# Patient Record
Sex: MALE | Race: BLACK OR AFRICAN AMERICAN | NOT HISPANIC OR LATINO | Employment: FULL TIME | URBAN - METROPOLITAN AREA
[De-identification: names, ages, dates, MRNs, and addresses within clinical notes are randomized per-mention and may not be internally consistent; named-entity substitution may affect disease eponyms.]

---

## 2020-11-01 ENCOUNTER — COMMUNICATION - HEALTHEAST (OUTPATIENT)
Dept: SCHEDULING | Facility: CLINIC | Age: 30
End: 2020-11-01

## 2021-10-09 ENCOUNTER — HOSPITAL ENCOUNTER (EMERGENCY)
Facility: HOSPITAL | Age: 31
Discharge: HOME | End: 2021-10-09
Admitting: PHYSICIAN ASSISTANT
Payer: MEDICAID

## 2021-10-09 VITALS
RESPIRATION RATE: 14 BRPM | DIASTOLIC BLOOD PRESSURE: 96 MMHG | OXYGEN SATURATION: 100 % | SYSTOLIC BLOOD PRESSURE: 163 MMHG | HEART RATE: 64 BPM | WEIGHT: 315 LBS | BODY MASS INDEX: 43.63 KG/M2 | TEMPERATURE: 98.2 F

## 2021-10-09 DIAGNOSIS — Z20.822 EXPOSURE TO 2019 NOVEL CORONAVIRUS: ICD-10-CM

## 2021-10-09 LAB — SARS-COV-2 RNA RESP QL NAA+PROBE: NEGATIVE

## 2021-10-09 PROCEDURE — 87635 SARS-COV-2 COVID-19 AMP PRB: CPT | Performed by: EMERGENCY MEDICINE

## 2021-10-09 PROCEDURE — 99283 EMERGENCY DEPT VISIT LOW MDM: CPT

## 2021-10-09 PROCEDURE — C9803 HOPD COVID-19 SPEC COLLECT: HCPCS

## 2021-10-09 NOTE — ED PROVIDER NOTES
ED PROVIDER NOTE    EMERGENCY DEPARTMENT ENCOUNTER      NAME: Kar Douglas  AGE: 31 year old male  YOB: 1990  MRN: [id number]  EVALUATION DATE & TIME: No admission date for patient encounter.    PCP: No primary care provider on file.    ED PROVIDER: Shawna Randall PA-C      Chief Complaint   Patient presents with     Covid 19 Testing         FINAL IMPRESSION:  1. Exposure to 2019 novel coronavirus          MEDICAL DECISION MAKING:    Pertinent Labs & Imaging studies reviewed. (See chart for details)  31 year old male who presents to the Emergency Department for covid testing.    Exposed to someone in his shared housing complex that tested +1-week ago but he does not have any symptoms.    Vitals stable other than some mild asymptomatic hypertension.  He looks well, exam normal.    Covid testing obtained and actually returned while he was still in the department so he was updated on this negative result.      At the conclusion of the encounter I discussed the results of all of the tests and the disposition. The questions were answered. The patient or family acknowledged understanding and was agreeable with the care plan.       ED COURSE  10:36 AM  Met and evaluated patient. Discussed ED plan.   11:19 AM Patient has been discharged but still in [place] with other roommates so was updated on negative test result by nurse      MEDICATIONS GIVEN IN THE EMERGENCY:  Medications - No data to display    NEW PRESCRIPTIONS STARTED AT TODAY'S ER VISIT  New Prescriptions    No medications on file          =================================================================    HPI    Patient information was obtained from: Patient    Kar Douglas is a 31 year old male who is otherwise healthywho presents for covid testing.    Patient exposed to someone in shared housing complex that tested positive for covid 1 week ago.    Patient is not having any symptoms.  Feels fine. No complaints      REVIEW OF SYSTEMS    Constitutional: Negative for fevers, chills.  Vision: Negative for vision changes  HENT:  Negative for congestion, sore throat  Pulmonary: Negative for shortness of breath. No cough  Cardiac: Negative for chest pain  GI:Negative for nausea, vomiting, diarrhea, abdominal pain  : Negative for urinary symptoms  Musculoskeletal: Negative for extremity pain  Skin: Negative for skin changes  Neuro: Negative for focal weakness, numbness    All other systems reviewed and are negative        PAST MEDICAL HISTORY:  Past Medical History:   Diagnosis Date     Bipolar disorder (H)      Schizophrenia (H)        PAST SURGICAL HISTORY:  History reviewed. No pertinent surgical history.        CURRENT MEDICATIONS:    Denies taking any meds    ALLERGIES:  No Known Allergies    FAMILY HISTORY:  History reviewed. No pertinent family history.    SOCIAL HISTORY:   Smoking: + tobacco use  Alcohol: Denies  Illicit drug: Denies    Other: Lives in shared housing    VITALS:  Vitals:    10/09/21 1005   BP: (!) 163/96   Pulse: 64   Resp: 14   Temp: 98.2  F (36.8  C)   TempSrc: Temporal   SpO2: 100%   Weight: 150 kg (330 lb 11 oz)       PHYSICAL EXAM    General Appearance:  Alert, cooperative, no distress, appears stated age  HENT: Normocephalic without obvious deformity, atraumatic. Oropharynx benign.    Eyes: Conjunctiva clear, Lids normal. No discharge.   Respiratory: No distress. Lungs clear to ausculation bilaterally. No wheezes, rhonchi or stridor  Cardiovascular: Regular rate and rhythm, no murmur.   GI: Abdomen soft, nontender  Musculoskeletal: Moving all extremities. No gross deformities  Integument: Warm, dry, no rashes or lesions  Neurologic: Alert and orientated x3. No focal deficits.  Psych: Normal mood and affect        LAB:  Labs Ordered and Resulted from Time of ED Arrival Up to the Time of Departure from the ED - No data to display    RADIOLOGY:  No orders to display           Shawna Randall PA-C   Emergency Medicine        Shawna Randall PA-C  10/09/21 1129

## 2021-10-09 NOTE — DISCHARGE INSTRUCTIONS
"Your covid test is pending.    You will receive a phone call if your test comes back positive. Otherwise you can get your results via [employer].  Continue to quarantine until you know your results.        How can I protect others?  If you have symptoms (fever, cough, body aches or trouble breathing):  Stay home and away from others (self-isolate) until:  Your other symptoms have resolved (gotten better). And   You've had no fever--and no medicine that reduces fever--for 1 full day (24 hours). And   At least 10 days have passed since your symptoms started. (You may need to wait 20 days. Follow the advice of your care team.)  If you don't show symptoms, but testing showed that you have COVID-19:  Stay home and away from others (self-isolate) until at least 10 days have passed since the date of your first positive COVID-19 test.  During this time  Stay in your own room, even for meals. Use your own bathroom if you can.  Stay away from others in your home. No hugging, kissing or shaking hands. No visitors.  Don't go to work, school or anywhere else.  Clean \"high touch\" surfaces often (doorknobs, counters, handles). Use household cleaning spray or wipes.  You'll find a full list of  on the EPA website: www.epa.gov/pesticide-registration/list-n-disinfectants-use-against-sars-cov-2.  Cover your mouth and nose with a mask or other face covering to avoid spreading germs.  Wash your hands and face often. Use soap and water.  Caregivers in these groups are at risk for severe illness due to COVID-19:  People 65 years and older  People who live in a nursing home or long-term care facility  People with chronic disease (lung, heart, cancer, diabetes, kidney, liver, immunologic)  People who have a weakened immune system, including those who:  Are in cancer treatment  Take medicine that weakens the immune system, such as corticosteroids  Had a bone marrow or organ transplant  Have an immune deficiency  Have poorly controlled " HIV or AIDS  Are obese (body mass index of 40 or higher)  Smoke regularly  Caregivers should wear gloves while washing dishes, handling laundry and cleaning bedrooms and bathrooms.  Use caution when washing and drying laundry: Don't shake dirty laundry and use the warmest water setting that you can.  For more tips on managing your health at home, go to www.cdc.gov/coronavirus/2019-ncov/downloads/10Things.pdf.  How can I take care of myself at home?  Get lots of rest. Drink extra fluids (unless a doctor has told you not to).  Take Tylenol (acetaminophen) for fever or pain. If you have liver or kidney problems, ask your family doctor if it's okay to take Tylenol.   Adults can take either:   650 mg (two 325 mg pills) every 4 to 6 hours, or   1,000 mg (two 500 mg pills) every 8 hours as needed.  Note: Don't take more than 3,000 mg in one day. Acetaminophen is found in many medicines (both prescribed and over-the-counter medicines). Read all labels to be sure you don't take too much.

## 2021-11-07 ENCOUNTER — HOSPITAL ENCOUNTER (EMERGENCY)
Facility: HOSPITAL | Age: 31
Discharge: LEFT BEFORE DISCHARGE | End: 2021-11-07
Payer: MEDICAID

## 2021-11-07 VITALS — TEMPERATURE: 98.8 F | HEIGHT: 74 IN | WEIGHT: 315 LBS | BODY MASS INDEX: 40.43 KG/M2

## 2021-11-07 ASSESSMENT — MIFFLIN-ST. JEOR: SCORE: 2503.94

## 2021-11-07 NOTE — ED TRIAGE NOTES
Patient with diagnosis of schizo phrenia has not been on meds for 7 years. Was taking street drugs including meth, stopped in Aug when he was in treatment. Last month not sleeping well, having dreams, lst two days increasing anxiety, paranoia, denies SI or HI

## 2021-12-10 ENCOUNTER — HOSPITAL ENCOUNTER (EMERGENCY)
Facility: HOSPITAL | Age: 31
Discharge: LEFT BEFORE DISCHARGE | End: 2021-12-10
Attending: EMERGENCY MEDICINE
Payer: COMMERCIAL

## 2021-12-10 VITALS
OXYGEN SATURATION: 98 % | RESPIRATION RATE: 16 BRPM | SYSTOLIC BLOOD PRESSURE: 146 MMHG | HEART RATE: 75 BPM | WEIGHT: 315 LBS | BODY MASS INDEX: 43.01 KG/M2 | DIASTOLIC BLOOD PRESSURE: 85 MMHG | TEMPERATURE: 98.4 F

## 2021-12-10 DIAGNOSIS — Z53.21 PATIENT LEFT WITHOUT BEING SEEN: ICD-10-CM

## 2021-12-10 NOTE — ED TRIAGE NOTES
Patient presents here seeking help with resuming his medication schedule to treat his paranoid schizophrenia symptoms. He has not taken medications for about 7-8 years and has been taking street drugs. He reports that he has been sober and free of street drug use for four months and states that he is having difficulty with dealing with his feelings of paranoia and anxiety. He is not suicidal, but he does states that if another man stares him down, he has the urge to fight.

## 2021-12-11 NOTE — ED PROVIDER NOTES
8:06 PM I looked for the patient in the triage area and also the charge of the for the patient.  I called the patient's listed cell phone number and there is no answer.  Patient has apparently left after triage.     Pablo Ford,   12/10/21 2007

## 2022-05-08 ENCOUNTER — HOSPITAL ENCOUNTER (EMERGENCY)
Facility: HOSPITAL | Age: 32
Discharge: HOME | End: 2022-05-08
Attending: EMERGENCY MEDICINE | Admitting: EMERGENCY MEDICINE
Payer: COMMERCIAL

## 2022-05-08 VITALS
BODY MASS INDEX: 43.01 KG/M2 | WEIGHT: 315 LBS | TEMPERATURE: 97.9 F | RESPIRATION RATE: 16 BRPM | DIASTOLIC BLOOD PRESSURE: 97 MMHG | SYSTOLIC BLOOD PRESSURE: 175 MMHG | HEART RATE: 62 BPM | OXYGEN SATURATION: 100 %

## 2022-05-08 DIAGNOSIS — K04.7 DENTAL INFECTION: ICD-10-CM

## 2022-05-08 DIAGNOSIS — K02.9 DENTAL CARIES: ICD-10-CM

## 2022-05-08 PROCEDURE — 99282 EMERGENCY DEPT VISIT SF MDM: CPT

## 2022-05-08 NOTE — ED PROVIDER NOTES
EMERGENCY DEPARTMENT ENCOUNTER      NAME: Kar Douglas  AGE: 31 year old male  YOB: 1990  MRN: [id number]  EVALUATION DATE & TIME: 2022  5:06 PM    PCP: No Ref-Primary, Physician    ED PROVIDER: Jovani Enrique D.O.      Chief Complaint   Patient presents with     Oral Swelling       FINAL IMPRESSION:  1. Dental infection        ED COURSE & MEDICAL DECISION MAKIN:28 PM I met with the patient to gather history and to perform my initial exam. I discussed the plan for care while in the Emergency Department. PPE: surgical mask We discussed the plan for discharge and the patient is agreeable. Reviewed supportive cares, symptomatic treatment, outpatient follow up, and reasons to return to the Emergency Department. Patient to be discharged by ED RN.        Pertinent Labs & Imaging studies reviewed. (See chart for details)  31 year old male presents to the Emergency Department for evaluation of swelling of the right side of the face.  There is a dental carry at the origin of the swelling.  Patient be consistent with a dental infection without obvious drainable abscess.  Patient has a plan to have his tooth removed in 3 weeks.  Therefore at this time we will discharge on antibiotics for suspected dental infection and have follow-up as an outpatient with his dentist.  Return precautions were discussed.    At the conclusion of the encounter I discussed the results of all of the tests and the disposition. The questions were answered. The patient or family acknowledged understanding and was agreeable with the care plan.    HPI    Patient information was obtained from: patient     Use of : N/A      Kar Douglas is a 31 year old male who presents with right sided facial swelling.    Patient reports right sided facial swelling that he noticed after waking up 3 days ago. He states that his face is not painful unless he presses on it. Does have a scheduled appointment with his dentist in 3  weeks.  Denies any chest pain, shortness of breath, fever, cough, congestion, nausea, or vomiting. Denies any history of asthmas, diabetes, or having any other medical problems. Denies any history of surgeries. Notes that he smokes and drinks alcohol.       REVIEW OF SYSTEMS  Constitutional:  Denies fever, chills, weight loss or weakness  Eyes:  No pain, discharge, redness  HENT:  Denies sore throat, ear pain, congestion  Respiratory: No SOB, wheeze or cough  Cardiovascular:  No CP, palpitations  GI:  Denies abdominal pain, nausea, vomiting, diarrhea  : Denies dysuria, hematuria  Musculoskeletal: Positive for right sided facial swelling  Denies any new muscle/joint painor loss of function.  Skin: Denies rash, pallor  Neurologic:  Denies headache, focal weakness or sensory changes  Lymph: Denies swollen nodes    All other systems negative unless noted in HPI.    PAST MEDICAL HISTORY:  Past Medical History:   Diagnosis Date     Bipolar disorder (H)      Schizophrenia (H)        PAST SURGICAL HISTORY:  No past surgical history on file.      CURRENT MEDICATIONS:    No current facility-administered medications for this encounter.     Current Outpatient Medications   Medication     albuterol (PROVENTIL HFA;VENTOLIN HFA) 90 mcg/actuation inhaler     hydrOXYzine (VISTARIL) 25 MG capsule     hydrOXYzine (VISTARIL) 25 MG capsule     ibuprofen (ADVIL,MOTRIN) 600 MG tablet     QUEtiapine (SEROQUEL) 300 MG tablet     QUEtiapine (SEROQUEL) 50 MG tablet     therapeutic multivitamin (THERAGRAN) tablet         ALLERGIES:  No Known Allergies    FAMILY HISTORY:  No family history on file.    SOCIAL HISTORY:  Social History     Socioeconomic History     Marital status: Single   Tobacco Use     Smoking status: Current Every Day Smoker   Substance and Sexual Activity     Alcohol use: No     Drug use: Yes     Types: Marijuana       VITALS:  Patient Vitals for the past 24 hrs:   BP Temp Temp src Pulse Resp SpO2 Weight   05/08/22 1715  137/83 -- -- 60 -- 100 % --   05/08/22 1531 138/66 97.9  F (36.6  C) Temporal 72 16 99 % (!) 152 kg (335 lb)       PHYSICAL EXAM    VITAL SIGNS: /83   Pulse 60   Temp 97.9  F (36.6  C) (Temporal)   Resp 16   Wt (!) 152 kg (335 lb)   SpO2 100%   BMI 43.01 kg/m      General: Appears in no acute distress, awake, alert, interactive.  Eyes: Conjunctivae non-injected. Sclera anicteric.  HENT: Sakshi of upper right middle molar.  Atraumatic, normocephalic  Neck: Supple, normal ROM  Respiratory/Chest: Respiration unlabored, no tachypnea  Abdomen: non distended  Musculoskeletal: Normal extremities. No edema or erythema.  Skin: Normal color. No rash or diaphoresis.  Neurologic: Alert and oriented, face symmetric, moves all extremities spontaneously. Speech clear.  Psychiatric:  Affect normal, Judgment normal, Mood normal.        LABS  No results found for this or any previous visit (from the past 24 hour(s)).      RADIOLOGY  No orders to display        MEDICATIONS GIVEN IN THE EMERGENCY:  Medications - No data to display    NEW PRESCRIPTIONS STARTED AT TODAY'S ER VISIT  New Prescriptions    No medications on file      I, Brandy Molina, am serving as a scribe to document services personally performed by Dr. Jovani Enrique, based on my observations and the provider's statements to me.   I, Dr. Enrique, attest that Brandy Molina was acting in a scribe capacity, has observed my performance of the services and has documented them in accordance with my direction.    Jovani Enrique D.O.  Emergency Medicine  [facility] EMERGENCY DEPARTMENT  [address] [place] [id number]  [phone]  Dept: [phone]     Jovani Enrique, DO  05/08/22 2117       Jovani Enrique, DO  05/08/22 2118

## 2022-05-08 NOTE — ED TRIAGE NOTES
Pt arrives with right upper oral swelling for about 2-3 days. Denies pain unless he presses on the area. Able to eat and drink without difficulty. Does state he has a cavity, but his dental appointment is a month out. Worried about infection.

## 2022-07-20 ENCOUNTER — LAB REQUISITION (OUTPATIENT)
Dept: LAB | Facility: CLINIC | Age: 32
End: 2022-07-20

## 2022-07-20 DIAGNOSIS — Z13.1 ENCOUNTER FOR SCREENING FOR DIABETES MELLITUS: ICD-10-CM

## 2022-07-20 DIAGNOSIS — E66.01 MORBID (SEVERE) OBESITY DUE TO EXCESS CALORIES (H): ICD-10-CM

## 2022-07-20 DIAGNOSIS — R53.83 OTHER FATIGUE: ICD-10-CM

## 2022-07-20 LAB
ERYTHROCYTE [DISTWIDTH] IN BLOOD BY AUTOMATED COUNT: 12.4 % (ref 10–15)
HCT VFR BLD AUTO: 44.9 % (ref 40–53)
HGB BLD-MCNC: 14.6 G/DL (ref 13.3–17.7)
MCH RBC QN AUTO: 28.7 PG (ref 26.5–33)
MCHC RBC AUTO-ENTMCNC: 32.5 G/DL (ref 31.5–36.5)
MCV RBC AUTO: 88 FL (ref 78–100)
PLATELET # BLD AUTO: 258 10E3/UL (ref 150–450)
RBC # BLD AUTO: 5.09 10E6/UL (ref 4.4–5.9)
TSH SERPL DL<=0.005 MIU/L-ACNC: 1.58 UIU/ML (ref 0.3–4.2)
WBC # BLD AUTO: 6.7 10E3/UL (ref 4–11)

## 2022-07-20 PROCEDURE — 84443 ASSAY THYROID STIM HORMONE: CPT | Performed by: NURSE PRACTITIONER

## 2022-07-20 PROCEDURE — 80053 COMPREHEN METABOLIC PANEL: CPT | Performed by: NURSE PRACTITIONER

## 2022-07-20 PROCEDURE — 80061 LIPID PANEL: CPT | Performed by: NURSE PRACTITIONER

## 2022-07-20 PROCEDURE — 85014 HEMATOCRIT: CPT | Performed by: NURSE PRACTITIONER

## 2022-07-20 PROCEDURE — 82306 VITAMIN D 25 HYDROXY: CPT | Performed by: NURSE PRACTITIONER

## 2022-07-21 LAB
ALBUMIN SERPL BCG-MCNC: 4 G/DL (ref 3.5–5.2)
ALP SERPL-CCNC: 67 U/L (ref 40–129)
ALT SERPL W P-5'-P-CCNC: 14 U/L (ref 10–50)
ANION GAP SERPL CALCULATED.3IONS-SCNC: 11 MMOL/L (ref 7–15)
AST SERPL W P-5'-P-CCNC: 16 U/L (ref 10–50)
BILIRUB SERPL-MCNC: 0.5 MG/DL
BUN SERPL-MCNC: 11.7 MG/DL (ref 6–20)
CALCIUM SERPL-MCNC: 9.2 MG/DL (ref 8.6–10)
CHLORIDE SERPL-SCNC: 105 MMOL/L (ref 98–107)
CHOLEST SERPL-MCNC: 200 MG/DL
CREAT SERPL-MCNC: 1.01 MG/DL (ref 0.67–1.17)
DEPRECATED CALCIDIOL+CALCIFEROL SERPL-MC: 9 UG/L (ref 20–75)
DEPRECATED HCO3 PLAS-SCNC: 25 MMOL/L (ref 22–29)
GFR SERPL CREATININE-BSD FRML MDRD: >90 ML/MIN/1.73M2
GLUCOSE SERPL-MCNC: 95 MG/DL (ref 70–99)
HDLC SERPL-MCNC: 41 MG/DL
LDLC SERPL CALC-MCNC: 133 MG/DL
NONHDLC SERPL-MCNC: 159 MG/DL
POTASSIUM SERPL-SCNC: 3.9 MMOL/L (ref 3.4–5.3)
PROT SERPL-MCNC: 6.9 G/DL (ref 6.4–8.3)
SODIUM SERPL-SCNC: 141 MMOL/L (ref 136–145)
TRIGL SERPL-MCNC: 129 MG/DL

## 2023-04-18 ENCOUNTER — HOSPITAL ENCOUNTER (EMERGENCY)
Facility: HOSPITAL | Age: 33
Discharge: HOME | End: 2023-04-19
Attending: EMERGENCY MEDICINE | Admitting: EMERGENCY MEDICINE
Payer: COMMERCIAL

## 2023-04-18 DIAGNOSIS — F15.929 METHAMPHETAMINE INTOXICATION (H): ICD-10-CM

## 2023-04-18 DIAGNOSIS — F22 PARANOIA (H): ICD-10-CM

## 2023-04-18 DIAGNOSIS — F19.959 DRUG-INDUCED PSYCHOTIC DISORDER WITH COMPLICATION (H): ICD-10-CM

## 2023-04-18 LAB
ANION GAP SERPL CALCULATED.3IONS-SCNC: 9 MMOL/L (ref 7–15)
BUN SERPL-MCNC: 8.5 MG/DL (ref 6–20)
CALCIUM SERPL-MCNC: 9.4 MG/DL (ref 8.6–10)
CHLORIDE SERPL-SCNC: 101 MMOL/L (ref 98–107)
COHGB MFR BLD: 1.4 % (ref 0–1.5)
CREAT SERPL-MCNC: 1.09 MG/DL (ref 0.67–1.17)
DEPRECATED HCO3 PLAS-SCNC: 27 MMOL/L (ref 22–29)
ETHANOL SERPL-MCNC: <0.01 G/DL
GFR SERPL CREATININE-BSD FRML MDRD: >90 ML/MIN/1.73M2
GLUCOSE SERPL-MCNC: 100 MG/DL (ref 70–99)
POTASSIUM SERPL-SCNC: 3.7 MMOL/L (ref 3.4–5.3)
SODIUM SERPL-SCNC: 137 MMOL/L (ref 136–145)
TROPONIN T SERPL HS-MCNC: 10 NG/L

## 2023-04-18 PROCEDURE — 82375 ASSAY CARBOXYHB QUANT: CPT | Performed by: EMERGENCY MEDICINE

## 2023-04-18 PROCEDURE — 84484 ASSAY OF TROPONIN QUANT: CPT | Performed by: EMERGENCY MEDICINE

## 2023-04-18 PROCEDURE — 82077 ASSAY SPEC XCP UR&BREATH IA: CPT | Performed by: EMERGENCY MEDICINE

## 2023-04-18 PROCEDURE — 93005 ELECTROCARDIOGRAM TRACING: CPT | Performed by: EMERGENCY MEDICINE

## 2023-04-18 PROCEDURE — 250N000013 HC RX MED GY IP 250 OP 250 PS 637: Performed by: EMERGENCY MEDICINE

## 2023-04-18 PROCEDURE — 80048 BASIC METABOLIC PNL TOTAL CA: CPT | Performed by: EMERGENCY MEDICINE

## 2023-04-18 PROCEDURE — 36415 COLL VENOUS BLD VENIPUNCTURE: CPT | Performed by: EMERGENCY MEDICINE

## 2023-04-18 PROCEDURE — 99285 EMERGENCY DEPT VISIT HI MDM: CPT

## 2023-04-18 RX ORDER — LORAZEPAM 0.5 MG/1
2 TABLET ORAL ONCE
Status: COMPLETED | OUTPATIENT
Start: 2023-04-18 | End: 2023-04-18

## 2023-04-18 RX ORDER — RISPERIDONE 0.5 MG/1
1 TABLET, ORALLY DISINTEGRATING ORAL ONCE
Status: DISCONTINUED | OUTPATIENT
Start: 2023-04-18 | End: 2023-04-18

## 2023-04-18 RX ORDER — RISPERIDONE 0.5 MG/1
2 TABLET, ORALLY DISINTEGRATING ORAL ONCE
Status: COMPLETED | OUTPATIENT
Start: 2023-04-18 | End: 2023-04-18

## 2023-04-18 RX ADMIN — LORAZEPAM 2 MG: 0.5 TABLET ORAL at 19:53

## 2023-04-18 RX ADMIN — RISPERIDONE 2 MG: 0.5 TABLET, ORALLY DISINTEGRATING ORAL at 22:02

## 2023-04-18 ASSESSMENT — ACTIVITIES OF DAILY LIVING (ADL)
ADLS_ACUITY_SCORE: 35
ADLS_ACUITY_SCORE: 35

## 2023-04-18 NOTE — ED TRIAGE NOTES
Patient arrives to triage from home with chief complaint of possible carbon monoxide exposure.  Patient reports he was in his apartment today when the alarm went off.  Patient does report use of Methamphetamine use.  Patient reports he has not slept in three days and is paranoid.  Patient is alert and oriented x4.      Patient does endorse chest pressure and shortness of breath for the past couple of days.

## 2023-04-19 VITALS
SYSTOLIC BLOOD PRESSURE: 197 MMHG | RESPIRATION RATE: 21 BRPM | TEMPERATURE: 99 F | HEIGHT: 72 IN | OXYGEN SATURATION: 100 % | DIASTOLIC BLOOD PRESSURE: 103 MMHG | BODY MASS INDEX: 42.66 KG/M2 | HEART RATE: 63 BPM | WEIGHT: 315 LBS

## 2023-04-19 ASSESSMENT — ACTIVITIES OF DAILY LIVING (ADL)
ADLS_ACUITY_SCORE: 35
ADLS_ACUITY_SCORE: 35

## 2023-04-19 NOTE — ED PROVIDER NOTES
ED SIGNOUT  Date/Time:4/18/2023 9:18 PM    Patient signed out to me by my colleague, Markel Pacheco MD.  Please see their note for complete history and physical. Plan to follow up on repeat labs.    The creation of this record is based on the scribe s observations of the work being performed by Shira Ragsdale and the provider s statements to them. It was created on their behalf by Shira Ragsdale, a trained medical scribe. This document has been checked and approved by the attending provider.      REMAINING ED WORKUP:    Vitals:  BP (!) 197/103   Pulse 105   Temp 99  F (37.2  C) (Oral)   Resp 21   Ht 1.829 m (6')   Wt (!) 158.4 kg (349 lb 1.6 oz)   SpO2 100%   BMI 47.35 kg/m        Pertinent labs results reviewed   Results for orders placed or performed during the hospital encounter of 04/18/23   Basic metabolic panel   Result Value Ref Range    Sodium 137 136 - 145 mmol/L    Potassium 3.7 3.4 - 5.3 mmol/L    Chloride 101 98 - 107 mmol/L    Carbon Dioxide (CO2) 27 22 - 29 mmol/L    Anion Gap 9 7 - 15 mmol/L    Urea Nitrogen 8.5 6.0 - 20.0 mg/dL    Creatinine 1.09 0.67 - 1.17 mg/dL    Calcium 9.4 8.6 - 10.0 mg/dL    Glucose 100 (H) 70 - 99 mg/dL    GFR Estimate >90 >60 mL/min/1.73m2   Troponin T, High Sensitivity (now)   Result Value Ref Range    Troponin T, High Sensitivity 10 <=22 ng/L   Result Value Ref Range    Carbon Monoxide 1.4 0.0 - 1.5 %   Alcohol level blood   Result Value Ref Range    Alcohol ethyl <0.01 <=0.01 g/dL       Pertinent imaging reviewed   Please see official radiology report.  No orders to display        Interventions  Medications   LORazepam (ATIVAN) tablet 2 mg (2 mg Oral $Given 4/18/23 1953)   risperiDONE (risperDAL M-TABS) ODT tab 2 mg (2 mg Sublingual $Given 4/18/23 2202)        ED Course/MDM:  9:14 PM Signout accepted from Dr. Pacheco.  Prior records were reviewed.  Diagnostics from this visit are reviewed. I introduced myself to the patient.  10:48 PM Nurse updates that the patient  is in the lobby asking about plan for care.  10:52 PM Spoke with patient. Based on my assessment, I am comfortable with discharge. We discussed the plan for discharge and the patient is agreeable. Reviewed supportive cares, symptomatic treatment, outpatient follow up, and reasons to return to the Emergency Department. Patient to be discharged by ED RN.       Patient signed out pending clearance from suspected methamphetamine abuse.  Patient paranoid but not suicidal or homicidal.  He would not be holdable on my assessment after signout but was still hyperactive and paranoid.  Patient given risperidone 2 mg ODT which he tolerated well.  After several hours patient started to calm down with less paranoia.  Patient still slightly hyperactive and would intermittently pace the room but was now sitting in the chair and asking about plan.  I spoke to him about contacting his mom to get a ride home.  Patient reports either he will contact her or friend but felt he could go home and go to sleep now.  He would like to get some sleep after being up for 4 days following methamphetamine use and reports he has a new job in the morning.  Patient does not wish to remain in the ER further and at this time I feel patient has calm down enough he could be discharged and clearer at home from the methamphetamine use.  Patient will call for a ride and be plan for discharge.       1. Methamphetamine intoxication (H)    2. Paranoia (H)    3. Drug-induced psychotic disorder with complication (H)          Gordon Stevens MD  [facility] Emergency Department            Gordon Stevens MD  04/18/23 4759

## 2023-04-19 NOTE — ED NOTES
Attempting to monitor bp and hr but pt keeps removing bp cuff and pulse ox. Had discussions about why it is important to monitor, but pt still removes cuff/pulse ox

## 2023-04-19 NOTE — DISCHARGE INSTRUCTIONS
Chemical Dependency Treatment     [facility]  [email or web address]  [phone] or [phone]  [address] [place] [address]   Services include screening assessments, medicallysupervised detoxification, inpatient and outpatient evaluation and referral, combined inpatient to outpatient treatment sequences, family counseling and aftercare.      [place] [facility]   www.[place].org  [phone]  [address] [facility]   Serves adults and teens (minimum age is 16); has short-term treatment and a long-term recovery program; uses 12-step, cognitive behavioral therapy, motivational enhancement; faithbased, and recoverymanagement; high school on-site and [place] [employer] [facility] programming available      [facility]   www.[employer]zu/psy/[facility]  [phone]  [facility]  [facility]     [facility]   www.[employer].com  [phone]  [facility], [facility] [address]     [facility] (Inpatient & Outpatient)  [email or web address]  [phone]  Phone consultation available 24/7  In-person Assessments  [address] [place] [address], [facility] [address] [address]  [address] [place] [address] [address] [place] [address] (Inpatient & Outpatient)  http://www.[facility].org/mental-health-addiction/about.html  [place], [place]  Contact  for Chemical Health Evaluation, [phone]  Funded by: Rule 25 in [facility]. MedicalAssistance (MA) providers of [facility], [employer], Blue Cross, [employer], [employer], Medicare A&B. Private insurance reviewed case by case.     [facility]Based Inpatient & Outpatient)  http://[email or web address]/  [phone]  [address] [place]  [facility]  http://www.[facility]//specialties/mental-health/adap.html  [phone]  [facility] [place]  [address] [facility], [address]  [place] [address]  Assessments are available during the day and on a walk-in basis Monday-Friday starting at 8am.     [facility]  [phone]  [email or web address]  [address] [place] [address]     [facility] (Residential & Outpatient)  http://[email or web address]/  Women s Programs: [phone], [address], [place] [address]     [facility] (Does Rule 25 Assessments)  http://www.[facility].org/  [phone]  [address], [address], [place] [address]     [place]  [email or web address]  [phone]  [address] [place] [address]  [address] [place] [address]  Rule 25 Assessments, Substance Abuse Assessments, Men s & Women s Programs     [facility]  https://www.[employer].WP Engine/our-services/drug-alcohol-treatment  [phone], [address], [place] [address]  [phone], [address], [place], [place] [address] [phone], [address] [facility], [address], [place], [place] [address] [phone], [address], [address], ([facility]), [place], [place] [address] [phone], [address] [address] [place] [address]     NationalInstitute on Drug Abuse  https://www.drugabuse.gov/nidamed-medical-health-professionals

## 2023-04-19 NOTE — ED PROVIDER NOTES
EMERGENCY DEPARTMENT ENCOUNTER      NAME: Kar Douglas  AGE: 32 year old male  YOB: 1990  MRN: [id number]  EVALUATION DATE & TIME: No admission date for patient encounter.    PCP: No Ref-Primary, Physician    ED PROVIDER: Markel Pacheco M.D.      Chief Complaint   Patient presents with     Toxic Inhalation         FINAL IMPRESSION:  1. Methamphetamine intoxication (H)    2. Paranoia (H)          ED COURSE & MEDICAL DECISION MAKIN year old male presents to the Emergency Department for evaluation of concerns about paranoia, substance abuse, and possible toxic exposure.  He arrives to the emergency department and is mildly tachycardic and hypertensive in triage.  He has a history of documented substance abuse psychosis.  He does appear paranoid, at times seems to be occasionally interacting with invisible stimuli in the room.  He has not made any suicidal or homicidal statements.  He readily admits to using methamphetamine after relapse about 3 days ago.  He says he and slept in 3 days.  He denies any coingestions.  There was some concern that the carbon monoxide alarm was going off at his apartment, I cannot really verify this.  His carbon dioxide level here is negative as are his basic labs taken so far.  He certainly seems to be mildly intoxicated and paranoid.  He was given some Ativan here.  He is trying to recharge his phone at this time to provide us with contact information to reach out to his mom who helped escorted him here.  He is certainly on the border of whether or not he is able to care for himself.  At this point he seems to have fair insight and again has not made any kind of suicidal or homicidal statements.  He has been appropriate and cooperative with staff and has not showed any inclination to leave prior to discharge.  Unfortunately his labs hemolyzed including troponin and BMP which will require follow-up.  He remains hypertensive at this time, trending to mild  improvement without intervention and I think this probably reflects substance abuse and can continue to be monitored.  For now he is comfortable with remaining in the emergency department for continued observation.  If trying to leave would require reevaluation.  Discussed case with oncoming ED provider Dr. Stevens.  We are going to try to give him some risperidone to see if this helps further with symptoms.  We will monitor his progression overnight with symptoms and sobriety.  Please refer to note from oncoming ED provider for remainder of ED course and ultimate disposition.    At the conclusion of the encounter I discussed the results of all of the tests and the disposition. The questions were answered. The patient or family acknowledged understanding and was agreeable with the care plan.       Medical Decision Making    History:    Supplemental history from: Documented in chart, if applicable    External Record(s) reviewed: Documented in chart, if applicable.    Work Up:    Chart documentation includes differential considered and any EKGs or imaging independently interpreted by provider, where specified.    In additional to work up documented, I considered the following work up: Documented in chart, if applicable.    External consultation:    Discussion of management with another provider: Documented in chart, if applicable    Complicating factors:    Care impacted by chronic illness: Other: Polysubstance use    Care affected by social determinants of health: Alcohol Abuse and/or Recreational Drug Use    Disposition considerations: Patient signed out to oncoming ED provider.            MEDICATIONS GIVEN IN THE EMERGENCY:  Medications   risperiDONE (risperDAL M-TABS) ODT tab 2 mg (has no administration in time range)   LORazepam (ATIVAN) tablet 2 mg (2 mg Oral $Given 4/18/23 1953)       NEW PRESCRIPTIONS STARTED AT TODAY'S ER VISIT  New Prescriptions    No medications on file       "    =================================================================    HPI    Patient information was obtained from: patient     Use of : N/A       Kar Douglas is a 32 year old male with a pertinent history of polysubstance use who presents to this ED via walk-in for evaluation of carbon monoxide inhalation.    The patient reports he was in his apartment today when the carbon monoxide alarm went off.  Right now he feels chest pressure and mild shortness of breath that has gradually gotten worse over the last few days. During triage he noticed an elevated blood pressure reading of 230 and is also concerned about that. His mother brought him in for this but also because she found that he relapsed on methamphetamine 4 days ago. He has been smoking it and has not slept in 3 days. He seems interested in getting rehab treatment and says \"if I'm not in the right place I'll go somewhere else\". He had been sober for a while but does not elaborate on what worked to keep him sober last time. He asks \"if you hit rock bottom, pulled yourself back up, and then hit rock bottom again what would you do?\" The patient does not take any daily medications and has no chronic health problems.     He denies any other complaints at this time.     REVIEW OF SYSTEMS   All systems reviewed and negative except as noted in HPI.    PAST MEDICAL HISTORY:  Past Medical History:   Diagnosis Date     Bipolar disorder (H)      Schizophrenia (H)        PAST SURGICAL HISTORY:  No past surgical history on file.        CURRENT MEDICATIONS:    Current Facility-Administered Medications   Medication     risperiDONE (risperDAL M-TABS) ODT tab 2 mg     Current Outpatient Medications   Medication     albuterol (PROVENTIL HFA;VENTOLIN HFA) 90 mcg/actuation inhaler     hydrOXYzine (VISTARIL) 25 MG capsule     hydrOXYzine (VISTARIL) 25 MG capsule     ibuprofen (ADVIL,MOTRIN) 600 MG tablet     QUEtiapine (SEROQUEL) 300 MG tablet     QUEtiapine " (SEROQUEL) 50 MG tablet     therapeutic multivitamin (THERAGRAN) tablet         ALLERGIES:  No Known Allergies    FAMILY HISTORY:  No family history on file.    SOCIAL HISTORY:   Social History     Socioeconomic History     Marital status: Single   Tobacco Use     Smoking status: Every Day   Substance and Sexual Activity     Alcohol use: No     Drug use: Yes     Types: Marijuana       VITALS:  BP (!) 193/112   Pulse 109   Temp 99  F (37.2  C) (Oral)   Resp 21   Ht 1.829 m (6')   Wt (!) 158.4 kg (349 lb 1.6 oz)   SpO2 95%   BMI 47.35 kg/m      PHYSICAL EXAM    Constitutional: Well developed, Well nourished, sitting up in chair, no acute distress  HENT: Normocephalic, Atraumatic. Neck Supple.  Eyes: EOMI, Conjunctiva normal.  Respiratory: Breathing comfortably on room air. Speaks full sentences easily. Lungs clear to ascultation.  Cardiovascular: Normal heart rate, Regular rhythm. No peripheral edema.  Abdomen: Soft, nontender  Musculoskeletal: Good range of motion in all major joints. No major deformities noted.  Integument: Warm, Dry.  Neurologic: Alert & awake, Normal motor function, Normal sensory function, No focal deficits noted.   Psychiatric: Well-groomed.  Somewhat poor eye contact during exam.  Seems restless and anxious.  Stated mood is anxious.  Affect congruent.  Insight seems fair.  At times seems to be occasionally responding to some internal stimuli.  Reports some auditory hallucinations and having conversations with people he is not completely sure are present.  He denies any homicidal or suicidal ideations.     LAB:  All pertinent labs reviewed and interpreted.  Labs Ordered and Resulted from Time of ED Arrival to Time of ED Departure   BASIC METABOLIC PANEL - Abnormal       Result Value    Sodium 137      Potassium 3.7      Chloride 101      Carbon Dioxide (CO2) 27      Anion Gap 9      Urea Nitrogen 8.5      Creatinine 1.09      Calcium 9.4      Glucose 100 (*)     GFR Estimate >90      TROPONIN T, HIGH SENSITIVITY - Normal    Troponin T, High Sensitivity 10     CARBON MONOXIDE - Normal    Carbon Monoxide 1.4     ETHYL ALCOHOL LEVEL - Normal    Alcohol ethyl <0.01           EKG:    Performed at: April 18, 2023, 7:32 PM, [facility] EMERGENCY DEPARTMENT    Impression: Sinus tachycardia. Septal infarct, age undetermined. Abnormal ECG.    Rate: 112 BPM  Rhythm: Sinus tachycardia  Axis: 60 60 14  PA Interval: 176 ms  QRS Interval: 84 ms  QTc Interval: 316/431 ms  ST Changes: none  Comparison: When compared to ECG of 30-OCT-2020, rate has increased by 40.    I have independently reviewed and interpreted the EKG(s) documented above.          I, Abad Hodges, am serving as a scribe to document services personally performed by Dr. Markel Pacheco, based on my observation and the provider's statements to me. I, Markel Pacheco MD attest that Abad Hodges is acting in a scribe capacity, has observed my performance of the services and has documented them in accordance with my direction.    Markel Pacheco M.D.  Emergency Medicine  [facility] EMERGENCY DEPARTMENT  [address] [place] [id number]  [phone]  Dept: [phone]     Markel Pacheco MD  04/18/23 7066

## 2023-04-20 LAB
ATRIAL RATE - MUSE: 112 BPM
DIASTOLIC BLOOD PRESSURE - MUSE: NORMAL MMHG
INTERPRETATION ECG - MUSE: NORMAL
P AXIS - MUSE: 60 DEGREES
PR INTERVAL - MUSE: 176 MS
QRS DURATION - MUSE: 84 MS
QT - MUSE: 316 MS
QTC - MUSE: 431 MS
R AXIS - MUSE: 60 DEGREES
SYSTOLIC BLOOD PRESSURE - MUSE: NORMAL MMHG
T AXIS - MUSE: 14 DEGREES
VENTRICULAR RATE- MUSE: 112 BPM

## 2023-06-28 ENCOUNTER — HOSPITAL ENCOUNTER (EMERGENCY)
Facility: HOSPITAL | Age: 33
Discharge: HOME | End: 2023-06-28
Attending: EMERGENCY MEDICINE | Admitting: EMERGENCY MEDICINE
Payer: COMMERCIAL

## 2023-06-28 VITALS
BODY MASS INDEX: 41.75 KG/M2 | HEIGHT: 73 IN | DIASTOLIC BLOOD PRESSURE: 118 MMHG | TEMPERATURE: 97.5 F | SYSTOLIC BLOOD PRESSURE: 166 MMHG | HEART RATE: 74 BPM | OXYGEN SATURATION: 99 % | WEIGHT: 315 LBS | RESPIRATION RATE: 20 BRPM

## 2023-06-28 DIAGNOSIS — K08.89 PAIN, DENTAL: ICD-10-CM

## 2023-06-28 PROCEDURE — 99284 EMERGENCY DEPT VISIT MOD MDM: CPT | Mod: 25

## 2023-06-28 PROCEDURE — 250N000009 HC RX 250: Performed by: EMERGENCY MEDICINE

## 2023-06-28 PROCEDURE — 64400 NJX AA&/STRD TRIGEMINAL NRV: CPT

## 2023-06-28 PROCEDURE — 250N000013 HC RX MED GY IP 250 OP 250 PS 637: Performed by: EMERGENCY MEDICINE

## 2023-06-28 RX ORDER — HYDROCODONE BITARTRATE AND ACETAMINOPHEN 5; 325 MG/1; MG/1
1 TABLET ORAL ONCE
Status: COMPLETED | OUTPATIENT
Start: 2023-06-28 | End: 2023-06-28

## 2023-06-28 RX ORDER — HYDROCODONE BITARTRATE AND ACETAMINOPHEN 5; 325 MG/1; MG/1
1 TABLET ORAL
Qty: 4 TABLET | Refills: 0 | Status: SHIPPED | OUTPATIENT
Start: 2023-06-28 | End: 2023-07-02

## 2023-06-28 RX ORDER — HYDROCODONE BITARTRATE AND ACETAMINOPHEN 5; 325 MG/1; MG/1
1 TABLET ORAL ONCE
Status: DISCONTINUED | OUTPATIENT
Start: 2023-06-28 | End: 2023-06-28

## 2023-06-28 RX ORDER — AMOXICILLIN 875 MG
875 TABLET ORAL ONCE
Status: COMPLETED | OUTPATIENT
Start: 2023-06-28 | End: 2023-06-28

## 2023-06-28 RX ORDER — AMOXICILLIN 875 MG
875 TABLET ORAL 2 TIMES DAILY
Qty: 20 TABLET | Refills: 0 | Status: SHIPPED | OUTPATIENT
Start: 2023-06-28 | End: 2023-07-08

## 2023-06-28 RX ORDER — NICOTINE 21 MG/24HR
1 PATCH, TRANSDERMAL 24 HOURS TRANSDERMAL EVERY 24 HOURS
Qty: 21 PATCH | Refills: 0 | Status: SHIPPED | OUTPATIENT
Start: 2023-06-28 | End: 2023-11-16

## 2023-06-28 RX ORDER — BUPIVACAINE HYDROCHLORIDE AND EPINEPHRINE 5; 5 MG/ML; UG/ML
1.8 INJECTION, SOLUTION PERINEURAL ONCE
Status: COMPLETED | OUTPATIENT
Start: 2023-06-28 | End: 2023-06-28

## 2023-06-28 RX ADMIN — HYDROCODONE BITARTRATE AND ACETAMINOPHEN 1 TABLET: 5; 325 TABLET ORAL at 18:08

## 2023-06-28 RX ADMIN — Medication 3 ML: at 18:14

## 2023-06-28 RX ADMIN — BUPIVACAINE HYDROCHLORIDE AND EPINEPHRINE BITARTRATE 1.8 ML: 5; .005 INJECTION, SOLUTION SUBCUTANEOUS at 18:13

## 2023-06-28 RX ADMIN — AMOXICILLIN 875 MG: 875 TABLET, FILM COATED ORAL at 19:47

## 2023-06-28 ASSESSMENT — ACTIVITIES OF DAILY LIVING (ADL): ADLS_ACUITY_SCORE: 35

## 2023-06-28 NOTE — ED TRIAGE NOTES
Patient has dental pain in left upper jaw. Patient cannot get in to see the dentist for 3 weeks. Pain has effected eating and drinking. Patient has been having this pain for 2 days.     Triage Assessment     Row Name 06/28/23 7262       Triage Assessment (Adult)    Airway WDL WDL       Respiratory WDL    Respiratory WDL WDL       Skin Circulation/Temperature WDL    Skin Circulation/Temperature WDL WDL       Cardiac WDL    Cardiac WDL WDL       Peripheral/Neurovascular WDL    Peripheral Neurovascular WDL WDL       Cognitive/Neuro/Behavioral WDL    Cognitive/Neuro/Behavioral WDL WDL

## 2023-06-28 NOTE — ED PROVIDER NOTES
EMERGENCY DEPARTMENT NOTE     Name: Kar Douglas    Age/Sex: 33 year old male   MRN: [id number]   Evaluation Date & Time:  6/28/2023  5:27 PM    PCP:    No Ref-Primary, Physician   ED Provider: Pablo Ford D.O.       CHIEF COMPLAINT    Dental Pain       DIAGNOSIS & DISPOSITION     1. Pain, dental         DISPOSITION: Home    At the conclusion of the encounter I discussed the results of all of the tests and the disposition. The questions were answered. The patient or family acknowledged understanding and was agreeable with the care plan.    TOTAL CRITICAL CARE TIME (EXCLUDING PROCEDURES): Not applicable    PROCEDURES:     PROCEDURE: Dental Nerve Block   INDICATIONS: Dental pain   PROCEDURE PROVIDER: Dr Pablo Ford   SITE:  Left upper jaw     MEDICATION: 5 mL of 0.25% Bupivacaine with epinephrine   NOTE:  Alveolar maxillary block at the left upper jaw   COMPLICATIONS: Patient tolerated procedure well, without complication       EMERGENCY DEPARTMENT COURSE/MEDICAL DECISION MAKING   5:41 PM I met with the patient to gather history and to perform my initial exam.  We discussed treatment options and the plan for care while in the Emergency Department.    Kar Douglas is a 33 year old male with relevant past medical history of cannabis dependence  who presents to the emergency department for evaluation of approximately 2 days worth of generalized upper left sided dental pain.         Triage note reviewed:  Patient has dental pain in left upper jaw. Patient cannot get in to see the dentist for 3 weeks. Pain has effected eating and drinking. Patient has been having this pain for 2 days.     Triage Assessment     Row Name 06/28/23 1725       Triage Assessment (Adult)    Airway WDL WDL       Respiratory WDL    Respiratory WDL WDL       Skin Circulation/Temperature WDL    Skin Circulation/Temperature WDL WDL       Cardiac WDL    Cardiac WDL WDL       Peripheral/Neurovascular WDL    Peripheral Neurovascular WDL  "WDL       Cognitive/Neuro/Behavioral WDL    Cognitive/Neuro/Behavioral WDL WDL                Differential  diagnosis  considered included but not limited to dental abscess, facial cellulitis, dental pain    Diagnostic studies:  Imaging:  No orders to display        Lab:  Labs Ordered and Resulted from Time of ED Arrival to Time of ED Departure - No data to display         Interventions: Hydrocodone, dental block, amoxicillin  Discharge Vital Signs:BP (!) 166/118   Pulse 74   Temp 97.5  F (36.4  C) (Temporal)   Resp 20   Ht 1.854 m (6' 1\")   Wt 149.7 kg (330 lb)   SpO2 99%   BMI 43.54 kg/m      Medical Decision Making  Patient on exam had swelling of the gingiva around the left upper molar.  After dental block was probed with an 18-gauge needle but no drainable abscess.  There is also a fracture of tooth #16 with underlying dental carry.  Patient has made arrangements for follow-up with dentist but cannot be seen until July 30.  I have asked the patient to call his dentist to see if he can be seen earlier but is also given resources for dental follow-up.  Patient has a history of chemical dependency with methamphetamine.  Patient has predominance of pain at nighttime that has been causing insomnia.  I did prescribe #3 hydrocodone to take at night if recurrent pain not improved with Tylenol and ibuprofen.  Patient will be placed on amoxicillin.  Patient is a smoker which may be contributing to pain and was given nicotine patches and gum for smoking cessation.  Patient will be discharged, follow-up with dentist soon as possible.  Return criteria discussed and if uncontrolled pain or development of facial swelling or fever will return to the emergency department.    History:    Supplemental history from: Documented in chart, if applicable and N/A    External Record(s) reviewed: Documented in chart, if applicable.    Work Up:    Chart documentation includes differential considered and any EKGs or imaging " independently interpreted by provider, where specified.    In additional to work up documented, I considered the following work up: Documented in chart, if applicable.    External consultation:    Discussion of management with another provider: Documented in chart, if applicable    Complicating factors:    Care impacted by chronic illness: N/A    Care affected by social determinants of health: Access to Medical Care    Disposition considerations: Discharge. I prescribed additional prescription strength medication(s) as charted. N/A.          ED INTERVENTIONS     Medications   HYDROcodone-acetaminophen (NORCO) 5-325 MG per tablet 1 tablet (1 tablet Oral $Given 6/28/23 1808)   lido-EPINEPHrine-tetracaine (LET) topical gel GEL (3 mLs Topical $Given 6/28/23 1814)   bupivacaine 0.5 % EPINEPHrine 1:200,000 0.5% -[id number] dental injection SOLN 1.8 mL (1.8 mLs Intradermal $Given 6/28/23 1813)   amoxicillin (AMOXIL) tablet 875 mg (875 mg Oral $Given 6/28/23 1947)       DISCHARGE MEDICATIONS        Review of your medicines      UNREVIEWED medicines. Ask your doctor about these medicines      Dose / Directions   albuterol 108 (90 Base) MCG/ACT inhaler  Commonly known as: PROAIR HFA/PROVENTIL HFA/VENTOLIN HFA      Dose: 2 puff  [ALBUTEROL (PROVENTIL HFA;VENTOLIN HFA) 90 MCG/ACTUATION INHALER] Inhale 2 puffs.  Refills: 0     * hydrOXYzine 25 MG capsule  Commonly known as: VISTARIL      Dose: 25 mg  [HYDROXYZINE (VISTARIL) 25 MG CAPSULE] Take 25 mg by mouth 3 (three) times a day as needed for itching.  Refills: 0     * hydrOXYzine 25 MG capsule  Commonly known as: VISTARIL      Dose: 25 mg  [HYDROXYZINE (VISTARIL) 25 MG CAPSULE] Take 1 capsule (25 mg total) by mouth 3 (three) times a day as needed for itching.  Quantity: 15 capsule  Refills: 0     ibuprofen 600 MG tablet  Commonly known as: ADVIL/MOTRIN      Dose: 600 mg  [IBUPROFEN (ADVIL,MOTRIN) 600 MG TABLET] Take 600 mg by mouth.  Refills: 0     * QUEtiapine 300 MG  tablet  Commonly known as: SEROquel      Dose: 300 mg  [QUETIAPINE (SEROQUEL) 300 MG TABLET] Take 300 mg by mouth.  Refills: 0     * QUEtiapine 50 MG tablet  Commonly known as: SEROquel      Dose: 50 mg  [QUETIAPINE (SEROQUEL) 50 MG TABLET] Take 50 mg by mouth.  Refills: 0     THERAPEUTIC MULTIVITAMIN PO      Dose: 1 tablet  [THERAPEUTIC MULTIVITAMIN (THERAGRAN) TABLET] Take 1 tablet by mouth daily.  Refills: 0         * This list has 4 medication(s) that are the same as other medications prescribed for you. Read the directions carefully, and ask your doctor or other care provider to review them with you.            START taking      Dose / Directions   amoxicillin 875 MG tablet  Commonly known as: AMOXIL      Dose: 875 mg  Take 1 tablet (875 mg) by mouth 2 times daily for 10 days  Quantity: 20 tablet  Refills: 0     HYDROcodone-acetaminophen 5-325 MG tablet  Commonly known as: NORCO      Dose: 1 tablet  Take 1 tablet by mouth nightly as needed for severe pain  Quantity: 4 tablet  Refills: 0     nicotine 2 MG gum  Commonly known as: NICORETTE      Dose: 2 mg  Place 1 each (2 mg) inside cheek every hour as needed for smoking cessation  Quantity: 40 each  Refills: 0     nicotine 21 MG/24HR 24 hr patch  Commonly known as: NICODERM CQ      Dose: 1 patch  Place 1 patch onto the skin every 24 hours  Quantity: 21 patch  Refills: 0           Where to get your medicines      Some of these will need a paper prescription and others can be bought over the counter. Ask your nurse if you have questions.    Bring a paper prescription for each of these medications    amoxicillin 875 MG tablet    HYDROcodone-acetaminophen 5-325 MG tablet    nicotine 2 MG gum    nicotine 21 MG/24HR 24 hr patch           INFORMATION SOURCE AND LIMITATIONS    History/Exam limitations: None   Patient information was obtained from: Patient   Use of : N/A    HISTORY OF PRESENT ILLNESS   Kar Douglas is a 33 year old year old male with a  relevant past medical history of cannabis dependence who presents to this ED via walk-in for evaluation of dental pain.    Patient reports having approximately 2 days worth of generalized upper left-sided dental pain. He states he has a dentist appointment scheduled for 3 weeks from today. He denies any associated facial swelling. He denies any known allergies. He denies any other complications at this time.    REVIEW OF SYSTEMS:   Constitutional: Negative for fever.   HENT: Positive for dental problem (generalized upper left-sided dental pain). Negative for URI symptoms or sore throat. Negative for facial swelling.  Cardiac: Negative for chest pain, palpitations, near syncope or syncope  Respiratory: Negative for cough and shortness of breath.    Gastrointestinal: Negative for abdominal pain, nausea, vomiting, constipation, diarrhea, rectal bleeding or melena.  Genitourinary: Negative for dysuria, flank pain and hematuria.   Musculoskeletal: Negative for back pain.   Skin: Negative for rash  Neurological: Negative for dizziness, headache, syncope, speech difficulty, unilateral weakness or imbalance with walking.   Hematological: Negative for adenopathy. Does not bruise/bleed easily.   Psychiatric/Behavioral: Negative for confusion.     PATIENT HISTORY     Past Medical History:   Diagnosis Date     Bipolar disorder (H)      Schizophrenia (H)      Patient Active Problem List   Diagnosis     Cannabis dependence, continuous (H)     Substance-induced psychotic disorder with delusions (H)     History reviewed. No pertinent surgical history.    No Known Allergies    OUTPATIENT MEDICATIONS     Discharge Medication List as of 6/28/2023  7:49 PM      START taking these medications    Details   amoxicillin (AMOXIL) 875 MG tablet Take 1 tablet (875 mg) by mouth 2 times daily for 10 days, Disp-20 tablet, R-0, Local Print      HYDROcodone-acetaminophen (NORCO) 5-325 MG tablet Take 1 tablet by mouth nightly as needed for severe  "pain, Disp-4 tablet, R-0, Local Print      nicotine (NICODERM CQ) 21 MG/24HR 24 hr patch Place 1 patch onto the skin every 24 hours, Disp-21 patch, R-0, Local Print      nicotine (NICORETTE) 2 MG gum Place 1 each (2 mg) inside cheek every hour as needed for smoking cessation, Disp-40 each, R-0, Local Print            Vitals:    06/28/23 1723   BP: (!) 166/118   Pulse: 74   Resp: 20   Temp: 97.5  F (36.4  C)   TempSrc: Temporal   SpO2: 99%   Weight: 149.7 kg (330 lb)   Height: 1.854 m (6' 1\")       Physical Exam   Constitutional: Oriented to person, place, and time. Appears well-developed and well-nourished.   HEENT:   Head: Atraumatic. Carried teeth from tooth 16 to 18. Gingival swelling at tooth 18. No facial swelling or buccal cellulitis.   Neck: Normal range of motion. Neck supple.   Cardiovascular: Normal rate, regular rhythm and normal heart sounds.    Pulmonary/Chest: Normal effort  and breath sounds normal.   Abdominal: Soft. Bowel sounds are normal.   Musculoskeletal: Normal range of motion.   Neurological: Alert and oriented to person, place, and time. Normal strength. No sensory deficit. No cranial nerve deficit.  Skin: Skin is warm and dry.   Psychiatric: Normal mood and affect. Behavior is normal. Thought content normal.     DIAGNOSTICS    LABORATORY FINDINGS (REVIEWED AND INTERPRETED):  Labs Ordered and Resulted from Time of ED Arrival to Time of ED Departure - No data to display      IMAGING (REVIEWED AND INTERPRETED):  No orders to display           I, Carloz Simental, am serving as a scribe to document services personally performed by Pablo Ford D.O., based on my observation and the provider s statements to me.    Pablo CARCAMO D.O., attest that Carloz Simental is acting in a scribe capacity, has observed my performance of the services and has documented them in accordance with my direction.    Pablo Ford D.O.  EMERGENCY MEDICINE   06/28/23  [facility] " EMERGENCY DEPARTMENT  [address] [place] [id number]  [phone]  Dept: [phone]       Pablo Ford DO  06/29/23 0341

## 2023-06-29 NOTE — DISCHARGE INSTRUCTIONS
Start amoxicillin twice daily for 10 days.  Take ibuprofen 400 mg every 8 hours and Tylenol 650 m g every 6 hours for pain management.  May use Vicodin at night only for pain not controlled with Tylenol or ibuprofen.  You have also been given a prescription for nicotine patches/gum to try and reduce amount of smoking which may be contributing the pain.  Contact your dentist and see if you can move the appointment that you have for dental care.  Below are listed other dental resources to try and get seen for definitive care as soon as possible.  If you have uncontrolled pain or develop other symptoms including facial swelling or fever return the emergency department.      Emergency Dental Care  Located in: [facility]  Address: [address] [place] [address] [address], [place] [address]  Phone: [phone]  [facility]   The Dental Emergency Room  [address] [facility] [address], [facility]  [phone] Accepts [place]    [facility]  [address] [facility] [address] [facility], [facility]  [phone] No Fee Hours and services vary each month - call them before going.  Sometimes only offer extractions.   [facility]  [address] ,[facility]  [phone] Sliding fee: ER visit - $50 up front  regular - $35 up front Call or arrive at 7:45 AM on Mondays, Tues, or Thursdays to sign up for same-day appointments for dentalpain / emergencies.        [facility] Sliding  fee  Call for details Walk-ins and same-day appointments  Walk-in's accepted 8-11AM and 1-4PM  Monday-Friday   [address]     [phone]          [facility] dental Sliding fee If no insurance, call first.    [address], [place]     [phone]          [facility]  [address], [place]  [phone] Sliding fee Call 8:00 AM Mon-Thurs for next-day  appointments (for emergencies/pain only) Help with [place] is available.        [facility]  [address] [facility], [facility]  [phone] Call for fees  Only for adult dental emergencies.  Costs about 30% less than private practice clinics  To sign up for the regular dental clinic, call [phone].        [facility]  [address]  [phone] Sliding fee scale For people without dentalinsurance only.   Cleaning, xray, exams, fillings, sealants only - no extractions or rootcanals, etc.  No walk-ins.        [facility] / [address]  [phone] No Fee No walk-ins, not accepting people with insurance. Extractions for uninsured people only. Call Friday 2PM to get on next week's list        [facility]   [address] [facility]  [phone] Sliding fee  $40 up front No walk-ins. Call at 8AM Mon-Fri forsame- day visits.  Can schedule Saturday emergency visits by calling Friday morning.   [facility]  [address] [facility]  [phone] Sliding fee  Call for details No walk-ins.  For emergency appointments:  Call on Thursday 3PM (for Friday appt) OR Call on Friday 3PM (for Monday appt)        [facility]  [address] [facility]  [phone] Sliding fee -   Call for details Call on Tuesdays at 3PM to get anurgent Weds. appointment.  Call anytime during business hours to schedule other appointments.             OTHER RESOURCES       [facility],   [facility] [facility] [address]  [address] [place]  [address]  [phone]    [facility]  [address] [place] [address]  [phone]      Referral Service, 5-800-DENTIST        Open 9a to 9p 7 days a week            Open 7 days a week 7a to 5p.                [employer], 1-538.920.9448 For people who are elderly,disabled, or medically compromised and have no other way to pay for dental care. Call to get an application. If approved, services are provided through volunteer dentists.

## 2023-11-13 ENCOUNTER — TELEPHONE (OUTPATIENT)
Dept: URGENT CARE | Facility: URGENT CARE | Age: 33
End: 2023-11-13

## 2023-11-13 ENCOUNTER — OFFICE VISIT (OUTPATIENT)
Dept: FAMILY MEDICINE | Facility: CLINIC | Age: 33
End: 2023-11-13
Payer: COMMERCIAL

## 2023-11-13 VITALS
HEART RATE: 88 BPM | SYSTOLIC BLOOD PRESSURE: 142 MMHG | OXYGEN SATURATION: 100 % | TEMPERATURE: 98.1 F | RESPIRATION RATE: 20 BRPM | DIASTOLIC BLOOD PRESSURE: 89 MMHG

## 2023-11-13 DIAGNOSIS — Z11.3 SCREEN FOR STD (SEXUALLY TRANSMITTED DISEASE): ICD-10-CM

## 2023-11-13 DIAGNOSIS — Z21 HIV POSITIVE, ASYMPTOMATIC (H): Primary | ICD-10-CM

## 2023-11-13 LAB
C TRACH DNA SPEC QL PROBE+SIG AMP: NEGATIVE
HIV 1+2 AB+HIV1 P24 AG SERPL QL IA: NONREACTIVE
N GONORRHOEA DNA SPEC QL NAA+PROBE: NEGATIVE
T PALLIDUM AB SER QL: NONREACTIVE

## 2023-11-13 PROCEDURE — 99203 OFFICE O/P NEW LOW 30 MIN: CPT | Performed by: NURSE PRACTITIONER

## 2023-11-13 PROCEDURE — 87389 HIV-1 AG W/HIV-1&-2 AB AG IA: CPT | Performed by: NURSE PRACTITIONER

## 2023-11-13 PROCEDURE — 87591 N.GONORRHOEAE DNA AMP PROB: CPT | Performed by: NURSE PRACTITIONER

## 2023-11-13 PROCEDURE — 36415 COLL VENOUS BLD VENIPUNCTURE: CPT | Performed by: NURSE PRACTITIONER

## 2023-11-13 PROCEDURE — 86780 TREPONEMA PALLIDUM: CPT | Performed by: NURSE PRACTITIONER

## 2023-11-13 PROCEDURE — 87491 CHLMYD TRACH DNA AMP PROBE: CPT | Performed by: NURSE PRACTITIONER

## 2023-11-13 ASSESSMENT — ENCOUNTER SYMPTOMS
CHILLS: 0
FEVER: 0

## 2023-11-13 NOTE — PROGRESS NOTES
Assessment & Plan     Screen for STD (sexually transmitted disease)    - Chlamydia & Gonorrhea by PCR, [facility] - Clinic Collect    HIV positive, asymptomatic (H)    - HIV Antigen Antibody Combo  - Treponema Abs w Reflex to RPR and Titer  - Adult Infectious Disease  Referral     Patient with positive HIV test while trying to donate plasma.  We will confirm and have patient see infectious disease.  Explained this general process.  No sex until discussed with infectious disease specialist.    Patient to inform partners.  Says he may need help locating partners.               No follow-ups on file.    Misa Valladares, [facility] DEONTE Lakhani is a 33 year old male who presents to clinic today for the following health issues:  Chief Complaint   Patient presents with    STD Check     Donated plasma and was informed that blood detected HIV x yesterday morning. No sx     HPI    Patient was donating plasma and was told that he has a positive HIV test.  Got out of skilled nursing about 6 weeks ago and says he has been having sexual intercourse with multiple women that he met downtown.  Denies history of IV drug abuse.      Denies any symptoms such as frequent infections or fevers/sweats.      Review of Systems   Constitutional:  Negative for chills and fever.           Objective    BP (!) 142/89 (BP Location: Right arm, Patient Position: Sitting, Cuff Size: Adult Large)   Pulse 88   Temp 98.1  F (36.7  C) (Tympanic)   Resp 20   SpO2 100%   Physical Exam  Constitutional:       Appearance: He is well-developed.   HENT:      Right Ear: External ear normal.      Left Ear: External ear normal.   Eyes:      General:         Right eye: No discharge.         Left eye: No discharge.      Conjunctiva/sclera: Conjunctivae normal.   Pulmonary:      Effort: Pulmonary effort is normal.   Musculoskeletal:         General: Normal range of motion.   Skin:     General: Skin is warm.   Neurological:       Mental Status: He is alert and oriented to person, place, and time.   Psychiatric:         Mood and Affect: Mood normal.         Behavior: Behavior normal.         Thought Content: Thought content normal.         Judgment: Judgment normal.

## 2023-11-13 NOTE — PATIENT INSTRUCTIONS
We are doing an HIV test.  If this is positive there is a second test to verify.  This can be treated with medications.     Avoid sharing needles, having sex until you know the results.  If you are positive, you will need to tell all of your recent partners that you are HIV positive so they can also be tested.      Somebody will call you to set up an infectious disease visit-doctor that specializes in treating HIV.

## 2023-11-14 NOTE — TELEPHONE ENCOUNTER
Patient was called with negative HIV results.  He does have an infectious disease appointment set up as he was told by [facility] that his sample showed HIV.    With his conflicting information, do recommend 1 additional recheck in 2 weeks and patient should with sexual intercourse and use condoms in the meantime.  Patient says he is very happy about this and hopes it was a mistake.    Said okay to keep infectious disease appointment and only cancel if second recheck is negative in a couple of weeks.    Misa Valladares, CNP

## 2023-11-16 ENCOUNTER — OFFICE VISIT (OUTPATIENT)
Dept: INFECTIOUS DISEASES | Facility: CLINIC | Age: 33
End: 2023-11-16
Payer: COMMERCIAL

## 2023-11-16 ENCOUNTER — LAB (OUTPATIENT)
Dept: LAB | Facility: CLINIC | Age: 33
End: 2023-11-16
Payer: COMMERCIAL

## 2023-11-16 VITALS
SYSTOLIC BLOOD PRESSURE: 122 MMHG | WEIGHT: 287 LBS | BODY MASS INDEX: 37.87 KG/M2 | OXYGEN SATURATION: 98 % | TEMPERATURE: 98.5 F | DIASTOLIC BLOOD PRESSURE: 84 MMHG | HEART RATE: 67 BPM

## 2023-11-16 DIAGNOSIS — Z20.6 HIV EXPOSURE: Primary | ICD-10-CM

## 2023-11-16 DIAGNOSIS — Z21 HIV POSITIVE, ASYMPTOMATIC (H): ICD-10-CM

## 2023-11-16 DIAGNOSIS — Z72.51 RISK FOR SEXUALLY TRANSMITTED DISEASE: ICD-10-CM

## 2023-11-16 DIAGNOSIS — Z20.6 HIV EXPOSURE: ICD-10-CM

## 2023-11-16 LAB
ALBUMIN SERPL BCG-MCNC: 3.9 G/DL (ref 3.5–5.2)
ALP SERPL-CCNC: 71 U/L (ref 40–150)
ALT SERPL W P-5'-P-CCNC: 10 U/L (ref 0–70)
ANION GAP SERPL CALCULATED.3IONS-SCNC: 8 MMOL/L (ref 7–15)
AST SERPL W P-5'-P-CCNC: 18 U/L (ref 0–45)
BILIRUB SERPL-MCNC: 0.4 MG/DL
BUN SERPL-MCNC: 9.6 MG/DL (ref 6–20)
CALCIUM SERPL-MCNC: 9.4 MG/DL (ref 8.6–10)
CHLORIDE SERPL-SCNC: 106 MMOL/L (ref 98–107)
CREAT SERPL-MCNC: 0.91 MG/DL (ref 0.67–1.17)
DEPRECATED HCO3 PLAS-SCNC: 28 MMOL/L (ref 22–29)
EGFRCR SERPLBLD CKD-EPI 2021: >90 ML/MIN/1.73M2
GLUCOSE SERPL-MCNC: 95 MG/DL (ref 70–99)
HBV CORE AB SERPL QL IA: NONREACTIVE
HBV SURFACE AB SERPL IA-ACNC: 0.81 M[IU]/ML
HBV SURFACE AB SERPL IA-ACNC: NONREACTIVE M[IU]/ML
HBV SURFACE AG SERPL QL IA: NONREACTIVE
HCV AB SERPL QL IA: NONREACTIVE
POTASSIUM SERPL-SCNC: 4.1 MMOL/L (ref 3.4–5.3)
PROT SERPL-MCNC: 6.7 G/DL (ref 6.4–8.3)
SODIUM SERPL-SCNC: 142 MMOL/L (ref 135–145)

## 2023-11-16 PROCEDURE — 36415 COLL VENOUS BLD VENIPUNCTURE: CPT

## 2023-11-16 PROCEDURE — 99203 OFFICE O/P NEW LOW 30 MIN: CPT | Performed by: INTERNAL MEDICINE

## 2023-11-16 PROCEDURE — 86706 HEP B SURFACE ANTIBODY: CPT

## 2023-11-16 PROCEDURE — 87340 HEPATITIS B SURFACE AG IA: CPT

## 2023-11-16 PROCEDURE — 86704 HEP B CORE ANTIBODY TOTAL: CPT

## 2023-11-16 PROCEDURE — 80053 COMPREHEN METABOLIC PANEL: CPT

## 2023-11-16 PROCEDURE — 87536 HIV-1 QUANT&REVRSE TRNSCRPJ: CPT

## 2023-11-16 PROCEDURE — 86803 HEPATITIS C AB TEST: CPT

## 2023-11-16 RX ORDER — FAMOTIDINE 20 MG
1 TABLET ORAL DAILY
COMMUNITY

## 2023-11-16 RX ORDER — OMEGA-3 FATTY ACIDS/FISH OIL 300-1000MG
1 CAPSULE ORAL DAILY
COMMUNITY

## 2023-11-16 NOTE — PROGRESS NOTES
[facility] INFECTIOUS DISEASE CLINIC [facility]    Date: 11/16/2023   Patient Name: Kar Douglas   YOB: 1990  MRN: [id number]      ASSESSMENT:  33-year-old man self-referred after having positive HIV screening at plasma center.    Positive HIV test.  Positive antibody test at local plasma center.  Unclear exactly what testing they do.  We called the center and were informed that the antibody screen was positive but the confirmatory test was still pending.  HIV antibody/antigen test was repeated at our lab which is negative.  High risk sexual behavior.  The patient reports multiple partners and inconsistent condom use.  He does not get screened for sexually transmitted infections frequently.  Gonorrhea/chlamydia urine probe and syphilis screen were negative few days ago.  No active symptoms of dysuria or penile lesions.  Denies receptive oral or anal sex.    PLAN:  -Discussed that bloods centers tend to have higher sensitivity HIV screening test, which allows for increased false positive tests  -Recommend confirmation with HIV viral load.  Last sexual encounter was more than a week ago  -Discussed prevention strategies including condom use and PrEP.  I reviewed in detail with the patient benefits of daily Truvada use in preventing HIV.  I also discussed the availability of Apretude as an alternative to daily medication.  The patient expressed interest in HIV prevention, but was worried that he could not take a medication daily.  I also stressed that PrEP does not prevent against other sexually transmitted infections or pregnancy  -CMP, hepatitis B serologies, HCV antibody    Patient will continue to abstain from sex until his HIV viral load returns.  I will contact him as soon as I see these results.  At that time he will let me know what he is feeling towards PrEP and if he wants to initiate this with us.    Michael Coyne MD  [facility]   Clinic phone: [phone]    Clinic fax: [phone]     ______________________________________________________________________    HISTORY OF PRESENT ILLNESS:   Kar Douglas is a 33 year old man who is referred for evaluation of HIV.  Patient recently was contacted by his plasma center with a positive HIV test.  He self-referred to our clinic for further evaluation.  He had an HIV test 3 days ago that was negative on the antigen/antibody combo test.    The patient is a heterosexual male, denies sex with other men.  He does have frequent partners and does not consistently wear condoms.  Sometimes his partners are homeless.  He admits to substance abuse addiction with methamphetamines and fentanyl.  He denies injection drug use.  He has been tested for STIs in the past, but not recently.  As part of his recent lab work-up he had a negative gonorrhea/chlamydia urine probe and a negative treponemal antibody.  He denies any recent discharge, dysuria, or penile lesions.      Interval History:  Not applicable      Review of Systems:  Ten systems reviewed and negative except for what is noted in the HPI     Past Medical History:  Past Medical History:   Diagnosis Date    Bipolar disorder (H)     Schizophrenia (H)        Past Surgical History:  No past surgical history on file.    Allergies:  No Known Allergies    Medications:    Current Outpatient Medications:     Cranberry 240 MG CAPS, Take 1 each by mouth daily, Disp: , Rfl:     omega 3 1000 MG CAPS, Take 1 each by mouth daily, Disp: , Rfl:     therapeutic multivitamin (THERAGRAN) tablet, Take 1 tablet by mouth daily, Disp: , Rfl:     Vitamin D, Cholecalciferol, 25 MCG (1000 UT) CAPS, Take 1 each by mouth daily, Disp: , Rfl:     Social History:  Social History     Socioeconomic History    Marital status: Single     Spouse name: Not on file    Number of children: Not on file    Years of education: Not on file    Highest education level: Not on file   Occupational History    Not on file    Tobacco Use    Smoking status: Every Day     Types: Cigarettes     Passive exposure: Never    Smokeless tobacco: Not on file   Substance and Sexual Activity    Alcohol use: Yes     Comment: drinks rarely    Drug use: Yes     Types: Marijuana    Sexual activity: Not on file   Other Topics Concern    Not on file   Social History Narrative    Not on file     Social Determinants of Health     Financial Resource Strain: Not on file   Food Insecurity: Not on file   Transportation Needs: Not on file   Physical Activity: Not on file   Stress: Not on file   Social Connections: Not on file   Interpersonal Safety: Not on file   Housing Stability: Not on file        Family History:  No family history on file.        PHYSICAL EXAM:    /84   Pulse 67   Temp 98.5  F (36.9  C) (Oral)   Wt 130.2 kg (287 lb)   SpO2 98%   BMI 37.87 kg/m      GENERAL:  well-developed, well-nourished, in no acute distress.   HENT:  Head is normocephalic, atraumatic. Oropharynx is moist without exudates or ulcers.  EYES:  Eyes have anicteric sclerae without conjunctival injection or stigmata of endocarditis.    NECK:  Supple. No cervical lymphadenopathy  LUNGS:  Clear to auscultation.  CARDIOVASCULAR:  Regular rate and rhythm with no murmurs, gallops or rubs.  ABDOMEN:  Normal bowel sounds, soft, nontender. No hepatosplenomegaly.  MUSCULOSKELETAL: Extremities warm and without edema. No joint swelling.  SKIN:  No acute rashes. No stigmata of endocarditis.  NEUROLOGIC: Grossly nonfocal. Normal gait and station        Pertinent labs:    Lab Results   Component Value Date     04/18/2023    POTASSIUM 3.7 04/18/2023    CHLORIDE 101 04/18/2023    CO2 27 04/18/2023    BUN 8.5 04/18/2023    CR 1.09 04/18/2023     (H) 04/18/2023       Lab Results   Component Value Date    WBC 6.7 07/20/2022    HGB 14.6 07/20/2022    HCT 44.9 07/20/2022     07/20/2022    MCV 88 07/20/2022    RDW 12.4 07/20/2022        Lab Results   Component Value  Date    BILITOTAL 0.5 07/20/2022    AST 16 07/20/2022    ALT 14 07/20/2022    PROTTOTAL 6.9 07/20/2022    ALBUMIN 4.0 07/20/2022    ALKPHOS 67 07/20/2022      Outside lab testing shows HIV antibody test on 10/31/2023 with a reactive result.  Reportedly, confirmatory test pending.      MICROBIOLOGY DATA:  Not applicable    RADIOLOGY:  Not applicable    Attestation:  Total time preparing to see this patient, face-to-face time, and coordinating care time on the same calendar date: 40 minutes.  Face-face time: 25 minutes.  Over 50% of face-to-face time was spent in counseling/coordination of care.

## 2023-11-16 NOTE — LETTER
11/16/2023         RE: Kar Douglas  [address]  [address]  [place] [address]        Dear Colleague,    Thank you for referring your patient, Kar Douglas, to the [facility]. Please see a copy of my visit note below.    [facility] INFECTIOUS DISEASE CLINIC [facility]    Date: 11/16/2023   Patient Name: Kar Douglas   YOB: 1990  MRN: [id number]      ASSESSMENT:  33-year-old man self-referred after having positive HIV screening at plasma center.    Positive HIV test.  Positive antibody test at local plasma center.  Unclear exactly what testing they do.  We called the center and were informed that the antibody screen was positive but the confirmatory test was still pending.  HIV antibody/antigen test was repeated at our lab which is negative.  High risk sexual behavior.  The patient reports multiple partners and inconsistent condom use.  He does not get screened for sexually transmitted infections frequently.  Gonorrhea/chlamydia urine probe and syphilis screen were negative few days ago.  No active symptoms of dysuria or penile lesions.  Denies receptive oral or anal sex.    PLAN:  -Discussed that bloods centers tend to have higher sensitivity HIV screening test, which allows for increased false positive tests  -Recommend confirmation with HIV viral load.  Last sexual encounter was more than a week ago  -Discussed prevention strategies including condom use and PrEP.  I reviewed in detail with the patient benefits of daily Truvada use in preventing HIV.  I also discussed the availability of Apretude as an alternative to daily medication.  The patient expressed interest in HIV prevention, but was worried that he could not take a medication daily.  I also stressed that PrEP does not prevent against other sexually transmitted infections or pregnancy  -CMP, hepatitis B serologies, HCV antibody    Patient will continue to abstain from sex until his HIV viral load  returns.  I will contact him as soon as I see these results.  At that time he will let me know what he is feeling towards PrEP and if he wants to initiate this with us.    Michael Coyne MD  [facility]   Clinic phone: [phone]   Clinic fax: [phone]     ______________________________________________________________________    HISTORY OF PRESENT ILLNESS:   Kra Douglas is a 33 year old man who is referred for evaluation of HIV.  Patient recently was contacted by his plasma center with a positive HIV test.  He self-referred to our clinic for further evaluation.  He had an HIV test 3 days ago that was negative on the antigen/antibody combo test.    The patient is a heterosexual male, denies sex with other men.  He does have frequent partners and does not consistently wear condoms.  Sometimes his partners are homeless.  He admits to substance abuse addiction with methamphetamines and fentanyl.  He denies injection drug use.  He has been tested for STIs in the past, but not recently.  As part of his recent lab work-up he had a negative gonorrhea/chlamydia urine probe and a negative treponemal antibody.  He denies any recent discharge, dysuria, or penile lesions.      Interval History:  Not applicable      Review of Systems:  Ten systems reviewed and negative except for what is noted in the HPI     Past Medical History:  Past Medical History:   Diagnosis Date     Bipolar disorder (H)      Schizophrenia (H)        Past Surgical History:  No past surgical history on file.    Allergies:  No Known Allergies    Medications:    Current Outpatient Medications:      Cranberry 240 MG CAPS, Take 1 each by mouth daily, Disp: , Rfl:      omega 3 1000 MG CAPS, Take 1 each by mouth daily, Disp: , Rfl:      therapeutic multivitamin (THERAGRAN) tablet, Take 1 tablet by mouth daily, Disp: , Rfl:      Vitamin D, Cholecalciferol, 25 MCG (1000 UT) CAPS, Take 1 each by mouth daily, Disp: , Rfl:      Social History:  Social History     Socioeconomic History     Marital status: Single     Spouse name: Not on file     Number of children: Not on file     Years of education: Not on file     Highest education level: Not on file   Occupational History     Not on file   Tobacco Use     Smoking status: Every Day     Types: Cigarettes     Passive exposure: Never     Smokeless tobacco: Not on file   Substance and Sexual Activity     Alcohol use: Yes     Comment: drinks rarely     Drug use: Yes     Types: Marijuana     Sexual activity: Not on file   Other Topics Concern     Not on file   Social History Narrative     Not on file     Social Determinants of Health     Financial Resource Strain: Not on file   Food Insecurity: Not on file   Transportation Needs: Not on file   Physical Activity: Not on file   Stress: Not on file   Social Connections: Not on file   Interpersonal Safety: Not on file   Housing Stability: Not on file        Family History:  No family history on file.        PHYSICAL EXAM:    /84   Pulse 67   Temp 98.5  F (36.9  C) (Oral)   Wt 130.2 kg (287 lb)   SpO2 98%   BMI 37.87 kg/m      GENERAL:  well-developed, well-nourished, in no acute distress.   HENT:  Head is normocephalic, atraumatic. Oropharynx is moist without exudates or ulcers.  EYES:  Eyes have anicteric sclerae without conjunctival injection or stigmata of endocarditis.    NECK:  Supple. No cervical lymphadenopathy  LUNGS:  Clear to auscultation.  CARDIOVASCULAR:  Regular rate and rhythm with no murmurs, gallops or rubs.  ABDOMEN:  Normal bowel sounds, soft, nontender. No hepatosplenomegaly.  MUSCULOSKELETAL: Extremities warm and without edema. No joint swelling.  SKIN:  No acute rashes. No stigmata of endocarditis.  NEUROLOGIC: Grossly nonfocal. Normal gait and station        Pertinent labs:    Lab Results   Component Value Date     04/18/2023    POTASSIUM 3.7 04/18/2023    CHLORIDE 101 04/18/2023    CO2 27 04/18/2023    BUN  8.5 04/18/2023    CR 1.09 04/18/2023     (H) 04/18/2023       Lab Results   Component Value Date    WBC 6.7 07/20/2022    HGB 14.6 07/20/2022    HCT 44.9 07/20/2022     07/20/2022    MCV 88 07/20/2022    RDW 12.4 07/20/2022        Lab Results   Component Value Date    BILITOTAL 0.5 07/20/2022    AST 16 07/20/2022    ALT 14 07/20/2022    PROTTOTAL 6.9 07/20/2022    ALBUMIN 4.0 07/20/2022    ALKPHOS 67 07/20/2022      Outside lab testing shows HIV antibody test on 10/31/2023 with a reactive result.  Reportedly, confirmatory test pending.      MICROBIOLOGY DATA:  Not applicable    RADIOLOGY:  Not applicable    Attestation:  Total time preparing to see this patient, face-to-face time, and coordinating care time on the same calendar date: 40 minutes.  Face-face time: 25 minutes.  Over 50% of face-to-face time was spent in counseling/coordination of care.      Again, thank you for allowing me to participate in the care of your patient.        Sincerely,        Michael Coyne MD

## 2023-11-18 LAB — HIV1 RNA # PLAS NAA DL=20: NOT DETECTED COPIES/ML

## 2024-01-03 ENCOUNTER — HOSPITAL ENCOUNTER (EMERGENCY)
Facility: HOSPITAL | Age: 34
Discharge: HOME | End: 2024-01-03
Attending: EMERGENCY MEDICINE | Admitting: EMERGENCY MEDICINE
Payer: COMMERCIAL

## 2024-01-03 VITALS
SYSTOLIC BLOOD PRESSURE: 139 MMHG | OXYGEN SATURATION: 99 % | HEART RATE: 109 BPM | TEMPERATURE: 98.6 F | DIASTOLIC BLOOD PRESSURE: 94 MMHG | RESPIRATION RATE: 20 BRPM

## 2024-01-03 DIAGNOSIS — R53.83 FATIGUE, UNSPECIFIED TYPE: ICD-10-CM

## 2024-01-03 DIAGNOSIS — R03.0 ELEVATED BLOOD PRESSURE READING WITHOUT DIAGNOSIS OF HYPERTENSION: ICD-10-CM

## 2024-01-03 DIAGNOSIS — Z71.1 CONCERN ABOUT STD IN MALE WITHOUT DIAGNOSIS: ICD-10-CM

## 2024-01-03 LAB — HIV 1+2 AB+HIV1 P24 AG SERPL QL IA: NONREACTIVE

## 2024-01-03 PROCEDURE — 87389 HIV-1 AG W/HIV-1&-2 AB AG IA: CPT | Performed by: EMERGENCY MEDICINE

## 2024-01-03 PROCEDURE — 36415 COLL VENOUS BLD VENIPUNCTURE: CPT | Performed by: EMERGENCY MEDICINE

## 2024-01-03 PROCEDURE — 99283 EMERGENCY DEPT VISIT LOW MDM: CPT

## 2024-01-03 NOTE — ED PROVIDER NOTES
EMERGENCY DEPARTMENT ENCOUNTER      NAME: Kar Douglas  AGE: 33 year old male  YOB: 1990  MRN: [id number]  EVALUATION DATE & TIME: 1/3/2024  5:34 AM    PCP: No Ref-Primary, Physician    ED PROVIDER: Jovana Tejeda MD    Chief Complaint   Patient presents with    Exposure to STD    Chest Pain         FINAL IMPRESSION:  1. Fatigue, unspecified type    2. Concern about STD in male without diagnosis    3. Elevated blood pressure reading without diagnosis of hypertension          ED COURSE & MEDICAL DECISION MAKING:    Pertinent Labs & Imaging studies reviewed. (See chart for details)  33 year old male with history of asthma, marijuana abuse with previous substance-induced psychosis who presents to the Emergency Department for evaluation of concern for HIV after he had a false positive rapid HIV test at the [facility] this past fall and negative confirmatory HIV testing x 2.  Patient is still not convinced that he does not have HIV after the false positive test.  He has discontinued his high risk sexual behavior in the interim.  Patient counseled that HIV is transmitted via blood, so there is no reason that people he is living with, friends and family should be getting ill without any blood exposure.  I do think patient seems this dressed and he admits that he has been extremely worried.  Thankfully he is changes hide risk sexual behavior in the interim and patient was applauded for that action.  He has had a 10 pound weight loss and some of this might be due to decreased oral intake as he states his appetite has been poor with all the above.  Would like a primary care referral to establish care.  Offered repeat HIV testing today as an additional reassurance.  Blood was obtained, results pending.  Discharged home with PCP referral.           Medical Decision Making    History:  Supplemental history from: Documented in chart  External Record(s) reviewed: Outpatient Record: November infectious disease  clinic note    Work Up:  Chart documentation includes differential considered and any EKGs or imaging independently interpreted by provider, see MDM  In additional to work up documented, I considered the following work up: see MDM    External consultation:  Discussion of management with another provider: N/A    Complicating factors:  Care impacted by chronic illness: Chronic Lung Disease and Mental Health  Care affected by social determinants of health: Access to Medical Care night shift no access to PCP, no PCP    Disposition considerations: Discharge. No recommendations on prescription strength medication(s). See documentation for any additional details.        At the conclusion of the encounter I discussed the results of all of the tests and the disposition. The questions were answered. The patient or family acknowledged understanding and was agreeable with the care plan.      MEDICATIONS GIVEN IN THE EMERGENCY:  Medications - No data to display    NEW PRESCRIPTIONS STARTED AT TODAY'S ER VISIT  New Prescriptions    No medications on file          =================================================================    HPI    Patient information was obtained from: Patient    Use of Intrepreter: N/A      Kar Douglas is a 33 year old male with pertinent medical history of asthma, marijuana abuse with previous substance-induced psychosis who presents concern for HIV.  Patient was donating plasma this past fall, and had a rapid HIV screen at the plasma center that was positive.  Followed up in the walk-in clinic and with infectious disease with confirmatory HIV testing, viral load that was negative.  The rapid screen was felt to be a false positive.  Patient was counseled regarding his high risk sexual behaviors.  He is in the interim discontinued his high risk sexual behaviors and has been 100% wearing a condom.  Patient denies any IVDU.  He states that he is still concerned that he has HIV.  He has not had any  ongoing testing in the interim.  He notes that he has been more stressed, anxious since the false positive HIV test.  Patient has paranoia, stating that people around him have been getting sick and he is worried that he is transmitting HIV to them.  Also notes a 10 pound weight loss associated with the above.      PAST MEDICAL HISTORY:  Past Medical History:   Diagnosis Date    Bipolar disorder (H)     Schizophrenia (H)        PAST SURGICAL HISTORY:  No past surgical history on file.    CURRENT MEDICATIONS:    Prior to Admission Medications   Prescriptions Last Dose Informant Patient Reported? Taking?   Cranberry 240 MG CAPS   Yes No   Sig: Take 1 each by mouth daily   Vitamin D, Cholecalciferol, 25 MCG (1000 UT) CAPS   Yes No   Sig: Take 1 each by mouth daily   omega 3 1000 MG CAPS   Yes No   Sig: Take 1 each by mouth daily   therapeutic multivitamin (THERAGRAN) tablet   Yes No   Sig: Take 1 tablet by mouth daily      Facility-Administered Medications: None       ALLERGIES:  No Known Allergies    FAMILY HISTORY:  No family history on file.    SOCIAL HISTORY:  Social History     Tobacco Use    Smoking status: Every Day     Types: Cigarettes     Passive exposure: Never   Substance Use Topics    Alcohol use: Yes     Comment: drinks rarely    Drug use: Yes     Types: Marijuana        VITALS:  Patient Vitals for the past 24 hrs:   BP Temp Temp src Pulse Resp SpO2   01/03/24 0529 (!) 184/118 98.6  F (37  C) Temporal 109 20 99 %       PHYSICAL EXAM    General Appearance: Well-appearing, well-nourished, no acute distress  Cardio: Mild tachycardia in the 100s, regular rhythm, hypertensive  Pulm:  No respiratory distress  Abdomen:  Soft, obese.  Extremities: Normal gait  Skin:  Skin warm, dry, no rashes  Neuro:  Alert and oriented ×3     Jovana Tejeda MD  Emergency Medicine  [facility] EMERGENCY DEPARTMENT  [address] [id number] [phone]  Dept:  [phone]     Jovana Tejeda MD  01/03/24 0626

## 2024-01-03 NOTE — DISCHARGE INSTRUCTIONS
Your HIV test today is pending.  You will be able to see these results on your "[employer]  Nursing staff, please show patient how to access the "[employer]" system.  Please call your primary care clinic to establish care for outpatient follow-up.  Your blood pressure was elevated today.  Will need blood pressure recheck in clinic and they can do additional testing for your chronic fatigue.

## 2024-01-03 NOTE — ED TRIAGE NOTES
"Pt arrives to triage for test results to be read. Pt with tangential speech making it hard to follow pt story. Pt presents with paper from his clinic for STD. Pt is unsure if he is HIV positive and is looking for confirmation. Pt also reports chest pain, but states, \"I'm not worried about that. I just want to make sure I'm not spreading anything\".         "

## 2024-03-03 ENCOUNTER — HEALTH MAINTENANCE LETTER (OUTPATIENT)
Age: 34
End: 2024-03-03

## 2025-03-15 ENCOUNTER — HEALTH MAINTENANCE LETTER (OUTPATIENT)
Age: 35
End: 2025-03-15